# Patient Record
Sex: MALE | Race: BLACK OR AFRICAN AMERICAN | Employment: UNEMPLOYED | URBAN - METROPOLITAN AREA
[De-identification: names, ages, dates, MRNs, and addresses within clinical notes are randomized per-mention and may not be internally consistent; named-entity substitution may affect disease eponyms.]

---

## 2024-01-21 ENCOUNTER — APPOINTMENT (OUTPATIENT)
Dept: GENERAL RADIOLOGY | Age: 29
DRG: 155 | End: 2024-01-21

## 2024-01-21 ENCOUNTER — HOSPITAL ENCOUNTER (INPATIENT)
Age: 29
LOS: 1 days | Discharge: HOME | DRG: 155 | End: 2024-01-21
Attending: EMERGENCY MEDICINE | Admitting: SURGERY

## 2024-01-21 ENCOUNTER — APPOINTMENT (OUTPATIENT)
Dept: CT IMAGING | Age: 29
DRG: 155 | End: 2024-01-21

## 2024-01-21 VITALS
RESPIRATION RATE: 16 BRPM | SYSTOLIC BLOOD PRESSURE: 151 MMHG | TEMPERATURE: 97.8 F | WEIGHT: 280 LBS | HEIGHT: 74 IN | BODY MASS INDEX: 35.94 KG/M2 | HEART RATE: 91 BPM | DIASTOLIC BLOOD PRESSURE: 100 MMHG | OXYGEN SATURATION: 99 %

## 2024-01-21 DIAGNOSIS — S22.31XA CLOSED FRACTURE OF ONE RIB OF RIGHT SIDE, INITIAL ENCOUNTER: ICD-10-CM
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
## 2024-01-21 DIAGNOSIS — S02.2XXA CLOSED FRACTURE OF NASAL BONE, INITIAL ENCOUNTER: ICD-10-CM

## 2024-01-21 PROBLEM — T14.90XA TRAUMA: Status: ACTIVE | Noted: 2024-01-21

## 2024-01-21 LAB
ABO + RH BLD: NORMAL
ALBUMIN SERPL-MCNC: 4.5 G/DL (ref 3.5–5.2)
ALP SERPL-CCNC: 58 U/L (ref 40–129)
ALT SERPL-CCNC: 48 U/L (ref 0–40)
AMPHET UR QL SCN: NEGATIVE
ANION GAP SERPL CALCULATED.3IONS-SCNC: 13 MMOL/L (ref 7–16)
ANION GAP SERPL CALCULATED.3IONS-SCNC: 13 MMOL/L (ref 7–16)
APAP SERPL-MCNC: <5 UG/ML (ref 10–30)
ARM BAND NUMBER: NORMAL
AST SERPL-CCNC: 45 U/L (ref 0–39)
B.E.: -1 MMOL/L (ref -3–3)
BARBITURATES UR QL SCN: NEGATIVE
BENZODIAZ UR QL: NEGATIVE
BILIRUB SERPL-MCNC: 0.2 MG/DL (ref 0–1.2)
BLOOD BANK SAMPLE EXPIRATION: NORMAL
BLOOD GROUP ANTIBODIES SERPL: NEGATIVE
BUN SERPL-MCNC: 10 MG/DL (ref 6–20)
BUN SERPL-MCNC: 8 MG/DL (ref 6–20)
BUPRENORPHINE UR QL: NEGATIVE
CALCIUM SERPL-MCNC: 8.8 MG/DL (ref 8.6–10.2)
CALCIUM SERPL-MCNC: 9 MG/DL (ref 8.6–10.2)
CANNABINOIDS UR QL SCN: POSITIVE
CHLORIDE SERPL-SCNC: 107 MMOL/L (ref 98–107)
CHLORIDE SERPL-SCNC: 107 MMOL/L (ref 98–107)
CLOT ANGLE.KAOLIN INDUCED BLD RES TEG: 68.5 DEG (ref 53–70)
CO2 SERPL-SCNC: 22 MMOL/L (ref 22–29)
CO2 SERPL-SCNC: 23 MMOL/L (ref 22–29)
COCAINE UR QL SCN: NEGATIVE
COHB: 4.4 % (ref 0–1.5)
CREAT SERPL-MCNC: 0.7 MG/DL (ref 0.7–1.2)
CREAT SERPL-MCNC: 0.9 MG/DL (ref 0.7–1.2)
CRITICAL: ABNORMAL
DATE ANALYZED: ABNORMAL
DATE OF COLLECTION: ABNORMAL
EPL-TEG: 0.7 % (ref 0–15)
ERYTHROCYTE [DISTWIDTH] IN BLOOD BY AUTOMATED COUNT: 14.3 % (ref 11.5–15)
ETHANOLAMINE SERPL-MCNC: 199 MG/DL
ETHANOLAMINE SERPL-MCNC: 250 MG/DL
FENTANYL UR QL: NEGATIVE
G-TEG: 8.5 KDYN/CM2 (ref 4.5–11)
GFR SERPL CREATININE-BSD FRML MDRD: >60 ML/MIN/1.73M2
GFR SERPL CREATININE-BSD FRML MDRD: >60 ML/MIN/1.73M2
GLUCOSE SERPL-MCNC: 100 MG/DL (ref 74–99)
GLUCOSE SERPL-MCNC: 92 MG/DL (ref 74–99)
HCO3: 22.6 MMOL/L (ref 22–26)
HCT VFR BLD AUTO: 40.3 % (ref 37–54)
HGB BLD-MCNC: 13.1 G/DL (ref 12.5–16.5)
HHB: 0.5 % (ref 0–5)
INR PPP: 1
KINETICS TEG: 1.5 MIN (ref 1–3)
LAB: ABNORMAL
LACTATE BLDV-SCNC: 1.8 MMOL/L (ref 0.5–2.2)
LY30 (LYSIS) TEG: 0.7 % (ref 0–8)
Lab: 255
MA (MAX CLOT) TEG: 62.9 MM (ref 50–70)
MCH RBC QN AUTO: 29.1 PG (ref 26–35)
MCHC RBC AUTO-ENTMCNC: 32.5 G/DL (ref 32–34.5)
MCV RBC AUTO: 89.6 FL (ref 80–99.9)
METHADONE UR QL: NEGATIVE
METHB: 0.4 % (ref 0–1.5)
MODE: ABNORMAL
O2 CONTENT: 19.5 ML/DL
O2 SATURATION: 99.5 % (ref 92–98.5)
O2HB: 94.7 % (ref 94–97)
OPERATOR ID: 2962
OPIATES UR QL SCN: NEGATIVE
OXYCODONE UR QL SCN: NEGATIVE
PARTIAL THROMBOPLASTIN TIME: 29.2 SEC (ref 24.5–35.1)
PATIENT TEMP: 37 C
PCO2: 34.4 MMHG (ref 35–45)
PCP UR QL SCN: NEGATIVE
PH BLOOD GAS: 7.43 (ref 7.35–7.45)
PLATELET # BLD AUTO: 219 K/UL (ref 130–450)
PMV BLD AUTO: 10.4 FL (ref 7–12)
PO2: 310 MMHG (ref 75–100)
POTASSIUM SERPL-SCNC: 3.59 MMOL/L (ref 3.5–5)
POTASSIUM SERPL-SCNC: 3.6 MMOL/L (ref 3.5–5)
POTASSIUM SERPL-SCNC: 3.8 MMOL/L (ref 3.5–5)
PROT SERPL-MCNC: 7.9 G/DL (ref 6.4–8.3)
PROTHROMBIN TIME: 10.9 SEC (ref 9.3–12.4)
RBC # BLD AUTO: 4.5 M/UL (ref 3.8–5.8)
REACTION TIME TEG: 5.1 MIN (ref 5–10)
SALICYLATES SERPL-MCNC: <0.3 MG/DL (ref 0–30)
SODIUM SERPL-SCNC: 142 MMOL/L (ref 132–146)
SODIUM SERPL-SCNC: 143 MMOL/L (ref 132–146)
SOURCE, BLOOD GAS: ABNORMAL
TEST INFORMATION: ABNORMAL
THB: 14.1 G/DL (ref 11.5–16.5)
TIME ANALYZED: 257
TOXIC TRICYCLIC SC,BLOOD: NEGATIVE
WBC OTHER # BLD: 8.6 K/UL (ref 4.5–11.5)

## 2024-01-21 PROCEDURE — 90714 TD VACC NO PRESV 7 YRS+ IM: CPT | Performed by: STUDENT IN AN ORGANIZED HEALTH CARE EDUCATION/TRAINING PROGRAM

## 2024-01-21 PROCEDURE — 85027 COMPLETE CBC AUTOMATED: CPT

## 2024-01-21 PROCEDURE — 36415 COLL VENOUS BLD VENIPUNCTURE: CPT

## 2024-01-21 PROCEDURE — 6810039001 HC L1 TRAUMA PRIORITY

## 2024-01-21 PROCEDURE — 70486 CT MAXILLOFACIAL W/O DYE: CPT

## 2024-01-21 PROCEDURE — 6370000000 HC RX 637 (ALT 250 FOR IP): Performed by: STUDENT IN AN ORGANIZED HEALTH CARE EDUCATION/TRAINING PROGRAM

## 2024-01-21 PROCEDURE — 71045 X-RAY EXAM CHEST 1 VIEW: CPT

## 2024-01-21 PROCEDURE — 90471 IMMUNIZATION ADMIN: CPT | Performed by: STUDENT IN AN ORGANIZED HEALTH CARE EDUCATION/TRAINING PROGRAM

## 2024-01-21 PROCEDURE — 80053 COMPREHEN METABOLIC PANEL: CPT

## 2024-01-21 PROCEDURE — 86901 BLOOD TYPING SEROLOGIC RH(D): CPT

## 2024-01-21 PROCEDURE — 2580000003 HC RX 258: Performed by: STUDENT IN AN ORGANIZED HEALTH CARE EDUCATION/TRAINING PROGRAM

## 2024-01-21 PROCEDURE — 85384 FIBRINOGEN ACTIVITY: CPT

## 2024-01-21 PROCEDURE — 6360000002 HC RX W HCPCS: Performed by: STUDENT IN AN ORGANIZED HEALTH CARE EDUCATION/TRAINING PROGRAM

## 2024-01-21 PROCEDURE — 85576 BLOOD PLATELET AGGREGATION: CPT

## 2024-01-21 PROCEDURE — 70450 CT HEAD/BRAIN W/O DYE: CPT

## 2024-01-21 PROCEDURE — 85347 COAGULATION TIME ACTIVATED: CPT

## 2024-01-21 PROCEDURE — 85730 THROMBOPLASTIN TIME PARTIAL: CPT

## 2024-01-21 PROCEDURE — 97161 PT EVAL LOW COMPLEX 20 MIN: CPT

## 2024-01-21 PROCEDURE — G0480 DRUG TEST DEF 1-7 CLASSES: HCPCS

## 2024-01-21 PROCEDURE — 72125 CT NECK SPINE W/O DYE: CPT

## 2024-01-21 PROCEDURE — 97530 THERAPEUTIC ACTIVITIES: CPT

## 2024-01-21 PROCEDURE — 86850 RBC ANTIBODY SCREEN: CPT

## 2024-01-21 PROCEDURE — 80179 DRUG ASSAY SALICYLATE: CPT

## 2024-01-21 PROCEDURE — 71260 CT THORAX DX C+: CPT

## 2024-01-21 PROCEDURE — 85610 PROTHROMBIN TIME: CPT

## 2024-01-21 PROCEDURE — 80048 BASIC METABOLIC PNL TOTAL CA: CPT

## 2024-01-21 PROCEDURE — 1200000000 HC SEMI PRIVATE

## 2024-01-21 PROCEDURE — 80143 DRUG ASSAY ACETAMINOPHEN: CPT

## 2024-01-21 PROCEDURE — 84132 ASSAY OF SERUM POTASSIUM: CPT

## 2024-01-21 PROCEDURE — 80307 DRUG TEST PRSMV CHEM ANLYZR: CPT

## 2024-01-21 PROCEDURE — 85390 FIBRINOLYSINS SCREEN I&R: CPT

## 2024-01-21 PROCEDURE — 97165 OT EVAL LOW COMPLEX 30 MIN: CPT

## 2024-01-21 PROCEDURE — 99285 EMERGENCY DEPT VISIT HI MDM: CPT

## 2024-01-21 PROCEDURE — 82805 BLOOD GASES W/O2 SATURATION: CPT

## 2024-01-21 PROCEDURE — 6360000004 HC RX CONTRAST MEDICATION: Performed by: RADIOLOGY

## 2024-01-21 PROCEDURE — 74177 CT ABD & PELVIS W/CONTRAST: CPT

## 2024-01-21 PROCEDURE — 83605 ASSAY OF LACTIC ACID: CPT

## 2024-01-21 PROCEDURE — 86900 BLOOD TYPING SEROLOGIC ABO: CPT

## 2024-01-21 PROCEDURE — 72170 X-RAY EXAM OF PELVIS: CPT

## 2024-01-21 RX ORDER — SENNA AND DOCUSATE SODIUM 50; 8.6 MG/1; MG/1
1 TABLET, FILM COATED ORAL 2 TIMES DAILY
Status: DISCONTINUED | OUTPATIENT
Start: 2024-01-21 | End: 2024-01-21 | Stop reason: HOSPADM

## 2024-01-21 RX ORDER — ACETAMINOPHEN 500 MG
1000 TABLET ORAL ONCE
Status: DISCONTINUED | OUTPATIENT
Start: 2024-01-21 | End: 2024-01-21 | Stop reason: SDUPTHER

## 2024-01-21 RX ORDER — SODIUM CHLORIDE 0.9 % (FLUSH) 0.9 %
5-40 SYRINGE (ML) INJECTION EVERY 12 HOURS SCHEDULED
Status: DISCONTINUED | OUTPATIENT
Start: 2024-01-21 | End: 2024-01-21 | Stop reason: HOSPADM

## 2024-01-21 RX ORDER — SODIUM CHLORIDE 0.9 % (FLUSH) 0.9 %
5-40 SYRINGE (ML) INJECTION PRN
Status: DISCONTINUED | OUTPATIENT
Start: 2024-01-21 | End: 2024-01-21 | Stop reason: HOSPADM

## 2024-01-21 RX ORDER — METHOCARBAMOL 1000 MG/1
1000 TABLET, COATED ORAL 4 TIMES DAILY
Qty: 40 TABLET | Refills: 0 | Status: SHIPPED | OUTPATIENT
Start: 2024-01-21 | End: 2024-01-31

## 2024-01-21 RX ORDER — ONDANSETRON 2 MG/ML
4 INJECTION INTRAMUSCULAR; INTRAVENOUS EVERY 6 HOURS PRN
Status: DISCONTINUED | OUTPATIENT
Start: 2024-01-21 | End: 2024-01-21 | Stop reason: HOSPADM

## 2024-01-21 RX ORDER — OXYCODONE HYDROCHLORIDE 5 MG/1
5 TABLET ORAL EVERY 4 HOURS PRN
Status: DISCONTINUED | OUTPATIENT
Start: 2024-01-21 | End: 2024-01-21 | Stop reason: HOSPADM

## 2024-01-21 RX ORDER — TETANUS AND DIPHTHERIA TOXOIDS ADSORBED 2; 2 [LF]/.5ML; [LF]/.5ML
0.5 INJECTION INTRAMUSCULAR ONCE
Status: COMPLETED | OUTPATIENT
Start: 2024-01-21 | End: 2024-01-21

## 2024-01-21 RX ORDER — SENNA AND DOCUSATE SODIUM 50; 8.6 MG/1; MG/1
1 TABLET, FILM COATED ORAL DAILY
Qty: 30 TABLET | Refills: 0 | Status: SHIPPED | OUTPATIENT
Start: 2024-01-21 | End: 2024-02-20

## 2024-01-21 RX ORDER — SODIUM CHLORIDE 9 MG/ML
INJECTION, SOLUTION INTRAVENOUS CONTINUOUS
Status: DISCONTINUED | OUTPATIENT
Start: 2024-01-21 | End: 2024-01-21 | Stop reason: HOSPADM

## 2024-01-21 RX ORDER — OXYCODONE HYDROCHLORIDE 10 MG/1
10 TABLET ORAL EVERY 4 HOURS PRN
Status: DISCONTINUED | OUTPATIENT
Start: 2024-01-21 | End: 2024-01-21 | Stop reason: HOSPADM

## 2024-01-21 RX ORDER — POLYETHYLENE GLYCOL 3350 17 G/17G
17 POWDER, FOR SOLUTION ORAL DAILY
Status: DISCONTINUED | OUTPATIENT
Start: 2024-01-21 | End: 2024-01-21 | Stop reason: HOSPADM

## 2024-01-21 RX ORDER — BACITRACIN ZINC AND POLYMYXIN B SULFATE 500; 1000 [USP'U]/G; [USP'U]/G
OINTMENT TOPICAL
Qty: 15 G | Refills: 0 | Status: SHIPPED | OUTPATIENT
Start: 2024-01-21 | End: 2024-01-28

## 2024-01-21 RX ORDER — ONDANSETRON 2 MG/ML
4 INJECTION INTRAMUSCULAR; INTRAVENOUS EVERY 6 HOURS PRN
Status: DISCONTINUED | OUTPATIENT
Start: 2024-01-21 | End: 2024-01-21 | Stop reason: SDUPTHER

## 2024-01-21 RX ORDER — LIDOCAINE 4 G/G
1 PATCH TOPICAL DAILY
Status: DISCONTINUED | OUTPATIENT
Start: 2024-01-21 | End: 2024-01-21 | Stop reason: HOSPADM

## 2024-01-21 RX ORDER — ACETAMINOPHEN 325 MG/1
650 TABLET ORAL EVERY 6 HOURS
Status: DISCONTINUED | OUTPATIENT
Start: 2024-01-21 | End: 2024-01-21 | Stop reason: HOSPADM

## 2024-01-21 RX ORDER — SODIUM CHLORIDE 9 MG/ML
INJECTION, SOLUTION INTRAVENOUS PRN
Status: DISCONTINUED | OUTPATIENT
Start: 2024-01-21 | End: 2024-01-21 | Stop reason: HOSPADM

## 2024-01-21 RX ORDER — METHOCARBAMOL 500 MG/1
1000 TABLET, FILM COATED ORAL 4 TIMES DAILY
Status: DISCONTINUED | OUTPATIENT
Start: 2024-01-21 | End: 2024-01-21 | Stop reason: HOSPADM

## 2024-01-21 RX ORDER — ONDANSETRON 4 MG/1
4 TABLET, ORALLY DISINTEGRATING ORAL EVERY 8 HOURS PRN
Status: DISCONTINUED | OUTPATIENT
Start: 2024-01-21 | End: 2024-01-21 | Stop reason: HOSPADM

## 2024-01-21 RX ADMIN — IOPAMIDOL 75 ML: 755 INJECTION, SOLUTION INTRAVENOUS at 03:17

## 2024-01-21 RX ADMIN — SODIUM CHLORIDE: 9 INJECTION, SOLUTION INTRAVENOUS at 03:30

## 2024-01-21 RX ADMIN — SODIUM CHLORIDE, PRESERVATIVE FREE 10 ML: 5 INJECTION INTRAVENOUS at 08:57

## 2024-01-21 RX ADMIN — METHOCARBAMOL 1000 MG: 500 TABLET ORAL at 08:55

## 2024-01-21 RX ADMIN — TETANUS AND DIPHTHERIA TOXOIDS ADSORBED 0.5 ML: 2; 2 INJECTION INTRAMUSCULAR at 03:24

## 2024-01-21 RX ADMIN — SENNOSIDES AND DOCUSATE SODIUM 1 TABLET: 50; 8.6 TABLET ORAL at 08:55

## 2024-01-21 RX ADMIN — POLYETHYLENE GLYCOL 3350 17 G: 17 POWDER, FOR SOLUTION ORAL at 08:57

## 2024-01-21 RX ADMIN — ACETAMINOPHEN 650 MG: 325 TABLET ORAL at 08:55

## 2024-01-21 ASSESSMENT — PAIN SCALES - WONG BAKER
WONGBAKER_NUMERICALRESPONSE: 6

## 2024-01-21 ASSESSMENT — PAIN SCALES - GENERAL: PAINLEVEL_OUTOF10: 2

## 2024-01-21 NOTE — PLAN OF CARE
Problem: Pain  Goal: Verbalizes/displays adequate comfort level or baseline comfort level  1/21/2024 1055 by Josseline Ba RN  Outcome: Progressing  1/21/2024 0658 by Anisha Pete RN  Outcome: Progressing     Problem: Safety - Adult  Goal: Free from fall injury  1/21/2024 1055 by Josseline Ba RN  Outcome: Progressing  1/21/2024 0658 by Anisha Pete RN  Outcome: Progressing     Problem: Skin/Tissue Integrity  Goal: Absence of new skin breakdown  Description: 1.  Monitor for areas of redness and/or skin breakdown  2.  Assess vascular access sites hourly  3.  Every 4-6 hours minimum:  Change oxygen saturation probe site  4.  Every 4-6 hours:  If on nasal continuous positive airway pressure, respiratory therapy assess nares and determine need for appliance change or resting period.  1/21/2024 1055 by Josseline Ba RN  Outcome: Progressing  1/21/2024 0658 by Anisha Pete RN  Outcome: Progressing

## 2024-01-21 NOTE — ED PROVIDER NOTES
focal deficits, symmetric strength 5/5 in the upper and lower extremities bilaterally  Psych: Normal Affect    -------------------------------------------------- RESULTS -------------------------------------------------  I have personally reviewed all laboratory and imaging results for this patient. Results are listed below.     LABS:  Results for orders placed or performed during the hospital encounter of 01/21/24   Blood Gas, Arterial   Result Value Ref Range    Date Analyzed 20240121     Time Analyzed 0257     Source: Blood Arterial     pH, Blood Gas 7.435 7.350 - 7.450    PCO2 34.4 (L) 35.0 - 45.0 mmHg    PO2 310.0 (H) 75.0 - 100.0 mmHg    HCO3 22.6 22.0 - 26.0 mmol/L    B.E. -1.0 -3.0 - 3.0 mmol/L    O2 Sat 99.5 (H) 92.0 - 98.5 %    O2Hb 94.7 94.0 - 97.0 %    COHb 4.4 (H) 0.0 - 1.5 %    MetHb 0.4 0.0 - 1.5 %    O2 Content 19.5 mL/dL    HHb 0.5 0.0 - 5.0 %    tHb (est) 14.1 11.5 - 16.5 g/dL    Potassium 3.59 3.50 - 5.00 mmol/L    Mode NRB 15L     Date Of Collection 20240121     Time Collected 0255     Pt Temp 37.0 C     ID [id number]     Lab 74201     Critical(s) Notified . No Critical Values    CBC   Result Value Ref Range    WBC 8.6 4.5 - 11.5 k/uL    RBC 4.50 3.80 - 5.80 m/uL    Hemoglobin 13.1 12.5 - 16.5 g/dL    Hematocrit 40.3 37.0 - 54.0 %    MCV 89.6 80.0 - 99.9 fL    MCH 29.1 26.0 - 35.0 pg    MCHC 32.5 32.0 - 34.5 g/dL    RDW 14.3 11.5 - 15.0 %    Platelets 219 130 - 450 k/uL    MPV 10.4 7.0 - 12.0 fL   Comprehensive Metabolic Panel   Result Value Ref Range    Sodium 143 132 - 146 mmol/L    Potassium 3.6 3.5 - 5.0 mmol/L    Chloride 107 98 - 107 mmol/L    CO2 23 22 - 29 mmol/L    Anion Gap 13 7 - 16 mmol/L    Glucose 100 (H) 74 - 99 mg/dL    BUN 10 6 - 20 mg/dL    Creatinine 0.9 0.70 - 1.20 mg/dL    Est, Glom Filt Rate >60 >60 mL/min/1.73m2    Calcium 9.0 8.6 - 10.2 mg/dL    Total Protein 7.9 6.4 - 8.3 g/dL    Albumin 4.5 3.5 - 5.2 g/dL    Total Bilirubin 0.2 0.0 - 1.2 mg/dL    Alkaline Phosphatase

## 2024-01-21 NOTE — PLAN OF CARE
Problem: Pain  Goal: Verbalizes/displays adequate comfort level or baseline comfort level  Outcome: Progressing     Problem: Safety - Adult  Goal: Free from fall injury  Outcome: Progressing     Problem: Skin/Tissue Integrity  Goal: Absence of new skin breakdown  Description: 1.  Monitor for areas of redness and/or skin breakdown  2.  Assess vascular access sites hourly  3.  Every 4-6 hours minimum:  Change oxygen saturation probe site  4.  Every 4-6 hours:  If on nasal continuous positive airway pressure, respiratory therapy assess nares and determine need for appliance change or resting period.  Outcome: Progressing
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
## 2024-01-21 NOTE — ED NOTES
Pt log rolled onto L-side, C-spine precautions maintained  No step offs, deformities, or signs of trauma

## 2024-01-21 NOTE — DISCHARGE INSTRUCTIONS
TRAUMA SERVICES DISCHARGE INSTRUCTIONS    Call [phone], option 2, for any questions/concerns and for follow-up appointment in 2 week(s).    Please follow the instructions checked below:    Please follow-up with your primary care provider.    ACTIVITY INSTRUCTIONS  Increase activity as tolerated  No heavy lifting or strenuous activity  Take your incentive spirometer home and use 4-6 times/day    WOUND/DRESSING INSTRUCTIONS:  You may shower.  No sitting in bath tub, hot tub or swimming until cleared by physician.  Ice to areas of pain for first 24 hours.  Heat to areas of pain after that.  Wash areas of lacerations/abrasions with soap & water.  Rinse well.  Pat dry with clean towel.  Apply thin layer of Bacitracin, Neosporin, or triple antibiotic cream to affected area 2-3 times per day.  Keep wounds clean and dry.    MEDICATION INSTRUCTIONS  Take medication as prescribed.  When taking pain medications, you may experience dizziness or drowsiness.  Do not drink alcohol or drive when taking these medications.  You may experience constipation while taking pain medication.  You may take over the counter stool softeners such as docusate (Colace), sennosides S (Senokot-S), or Miralax.   [x]  You may take Ibuprofen (over the counter) as directed for mild pain.     --You may take up to 800mg every 8 hours for pain, please take with food or milk.   [x]  You may take acetaminophen (Tylenol) products.  Do NOT take more than 4000mg of Tylenol in 24h.   []  Do not take any other acetaminophen (Tylenol) products if you are taking Percocet or Norco, as these contain Tylenol.   --Do NOT take more than 4000mg of Tylenol in 24h.    OPIOID MEDICATION INSTRUCTIONS  Read the medication guide that is included with your prescription.  Take your medication exactly as prescribed.  Store medication away from children and in a safe place.  Do NOT share your medication with others.  Do NOT take medication unless it is prescribed for
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
## 2024-06-28 ENCOUNTER — HOSPITAL ENCOUNTER (INPATIENT)
Age: 29
LOS: 1 days | Discharge: HOME | End: 2024-06-29
Attending: EMERGENCY MEDICINE | Admitting: SURGERY

## 2024-06-28 ENCOUNTER — APPOINTMENT (OUTPATIENT)
Dept: GENERAL RADIOLOGY | Age: 29
End: 2024-06-28

## 2024-06-28 ENCOUNTER — APPOINTMENT (OUTPATIENT)
Dept: CT IMAGING | Age: 29
End: 2024-06-28
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
## 2024-06-28 DIAGNOSIS — S42.001A CLOSED DISPLACED FRACTURE OF RIGHT CLAVICLE, UNSPECIFIED PART OF CLAVICLE, INITIAL ENCOUNTER: ICD-10-CM

## 2024-06-28 PROBLEM — S42.101A CLOSED FRACTURE OF RIGHT SCAPULA: Status: ACTIVE | Noted: 2024-06-28

## 2024-06-28 PROBLEM — S42.031B: Status: ACTIVE | Noted: 2024-06-28

## 2024-06-28 LAB
ABO + RH BLD: NORMAL
ABO + RH BLD: NORMAL
ALBUMIN SERPL-MCNC: 4.1 G/DL (ref 3.5–5.2)
ALBUMIN SERPL-MCNC: 4.1 G/DL (ref 3.5–5.2)
ALP SERPL-CCNC: 63 U/L (ref 40–129)
ALP SERPL-CCNC: 63 U/L (ref 40–129)
ALT SERPL-CCNC: 58 U/L (ref 0–40)
ALT SERPL-CCNC: 58 U/L (ref 0–40)
AMPHET UR QL SCN: NEGATIVE
AMPHET UR QL SCN: NEGATIVE
ANION GAP SERPL CALCULATED.3IONS-SCNC: 12 MMOL/L (ref 7–16)
ANION GAP SERPL CALCULATED.3IONS-SCNC: 12 MMOL/L (ref 7–16)
APAP SERPL-MCNC: <5 UG/ML (ref 10–30)
APAP SERPL-MCNC: <5 UG/ML (ref 10–30)
ARM BAND NUMBER: NORMAL
ARM BAND NUMBER: NORMAL
AST SERPL-CCNC: 48 U/L (ref 0–39)
AST SERPL-CCNC: 48 U/L (ref 0–39)
B.E.: -1.4 MMOL/L (ref -3–3)
B.E.: -1.4 MMOL/L (ref -3–3)
BARBITURATES UR QL SCN: NEGATIVE
BARBITURATES UR QL SCN: NEGATIVE
BASOPHILS # BLD: 0.03 K/UL (ref 0–0.2)
BASOPHILS # BLD: 0.03 K/UL (ref 0–0.2)
BASOPHILS NFR BLD: 0 % (ref 0–2)
BASOPHILS NFR BLD: 0 % (ref 0–2)
BENZODIAZ UR QL: NEGATIVE
BENZODIAZ UR QL: NEGATIVE
BILIRUB SERPL-MCNC: 0.2 MG/DL (ref 0–1.2)
BILIRUB SERPL-MCNC: 0.2 MG/DL (ref 0–1.2)
BLOOD BANK SAMPLE EXPIRATION: NORMAL
BLOOD BANK SAMPLE EXPIRATION: NORMAL
BLOOD GROUP ANTIBODIES SERPL: NEGATIVE
BLOOD GROUP ANTIBODIES SERPL: NEGATIVE
BUN SERPL-MCNC: 12 MG/DL (ref 6–20)
BUN SERPL-MCNC: 12 MG/DL (ref 6–20)
BUPRENORPHINE UR QL: POSITIVE
BUPRENORPHINE UR QL: POSITIVE
CALCIUM SERPL-MCNC: 8.4 MG/DL (ref 8.6–10.2)
CALCIUM SERPL-MCNC: 8.4 MG/DL (ref 8.6–10.2)
CANNABINOIDS UR QL SCN: POSITIVE
CANNABINOIDS UR QL SCN: POSITIVE
CHLORIDE SERPL-SCNC: 104 MMOL/L (ref 98–107)
CHLORIDE SERPL-SCNC: 104 MMOL/L (ref 98–107)
CO2 SERPL-SCNC: 24 MMOL/L (ref 22–29)
CO2 SERPL-SCNC: 24 MMOL/L (ref 22–29)
COCAINE UR QL SCN: POSITIVE
COCAINE UR QL SCN: POSITIVE
COHB: 8 % (ref 0–1.5)
COHB: 8 % (ref 0–1.5)
CREAT SERPL-MCNC: 0.9 MG/DL (ref 0.7–1.2)
CREAT SERPL-MCNC: 0.9 MG/DL (ref 0.7–1.2)
CRITICAL: ABNORMAL
CRITICAL: ABNORMAL
DATE ANALYZED: ABNORMAL
DATE ANALYZED: ABNORMAL
DATE OF COLLECTION: ABNORMAL
DATE OF COLLECTION: ABNORMAL
EOSINOPHIL # BLD: 0.05 K/UL (ref 0.05–0.5)
EOSINOPHIL # BLD: 0.05 K/UL (ref 0.05–0.5)
EOSINOPHILS RELATIVE PERCENT: 1 % (ref 0–6)
EOSINOPHILS RELATIVE PERCENT: 1 % (ref 0–6)
ERYTHROCYTE [DISTWIDTH] IN BLOOD BY AUTOMATED COUNT: 14.4 % (ref 11.5–15)
ERYTHROCYTE [DISTWIDTH] IN BLOOD BY AUTOMATED COUNT: 14.4 % (ref 11.5–15)
ETHANOLAMINE SERPL-MCNC: 110 MG/DL (ref 0–0.08)
ETHANOLAMINE SERPL-MCNC: 110 MG/DL (ref 0–0.08)
FENTANYL UR QL: NEGATIVE
FENTANYL UR QL: NEGATIVE
GFR, ESTIMATED: >90 ML/MIN/1.73M2
GFR, ESTIMATED: >90 ML/MIN/1.73M2
GLUCOSE SERPL-MCNC: 140 MG/DL (ref 74–99)
GLUCOSE SERPL-MCNC: 140 MG/DL (ref 74–99)
HCO3: 24.2 MMOL/L (ref 22–26)
HCO3: 24.2 MMOL/L (ref 22–26)
HCT VFR BLD AUTO: 40.2 % (ref 37–54)
HCT VFR BLD AUTO: 40.2 % (ref 37–54)
HGB BLD-MCNC: 12.7 G/DL (ref 12.5–16.5)
HGB BLD-MCNC: 12.7 G/DL (ref 12.5–16.5)
HHB: 0.4 % (ref 0–5)
HHB: 0.4 % (ref 0–5)
IMM GRANULOCYTES # BLD AUTO: <0.03 K/UL (ref 0–0.58)
IMM GRANULOCYTES # BLD AUTO: <0.03 K/UL (ref 0–0.58)
IMM GRANULOCYTES NFR BLD: 0 % (ref 0–5)
IMM GRANULOCYTES NFR BLD: 0 % (ref 0–5)
INR PPP: 1
INR PPP: 1
LAB: ABNORMAL
LAB: ABNORMAL
LACTATE BLDV-SCNC: 2.4 MMOL/L (ref 0.5–2.2)
LACTATE BLDV-SCNC: 2.4 MMOL/L (ref 0.5–2.2)
LYMPHOCYTES NFR BLD: 4.23 K/UL (ref 1.5–4)
LYMPHOCYTES NFR BLD: 4.23 K/UL (ref 1.5–4)
LYMPHOCYTES RELATIVE PERCENT: 48 % (ref 20–42)
LYMPHOCYTES RELATIVE PERCENT: 48 % (ref 20–42)
Lab: 310
Lab: 310
MCH RBC QN AUTO: 28.5 PG (ref 26–35)
MCH RBC QN AUTO: 28.5 PG (ref 26–35)
MCHC RBC AUTO-ENTMCNC: 31.6 G/DL (ref 32–34.5)
MCHC RBC AUTO-ENTMCNC: 31.6 G/DL (ref 32–34.5)
MCV RBC AUTO: 90.3 FL (ref 80–99.9)
MCV RBC AUTO: 90.3 FL (ref 80–99.9)
METHADONE UR QL: NEGATIVE
METHADONE UR QL: NEGATIVE
METHB: 0.1 % (ref 0–1.5)
METHB: 0.1 % (ref 0–1.5)
MODE: ABNORMAL
MODE: ABNORMAL
MONOCYTES NFR BLD: 0.43 K/UL (ref 0.1–0.95)
MONOCYTES NFR BLD: 0.43 K/UL (ref 0.1–0.95)
MONOCYTES NFR BLD: 5 % (ref 2–12)
MONOCYTES NFR BLD: 5 % (ref 2–12)
NEUTROPHILS NFR BLD: 46 % (ref 43–80)
NEUTROPHILS NFR BLD: 46 % (ref 43–80)
NEUTS SEG NFR BLD: 4 K/UL (ref 1.8–7.3)
NEUTS SEG NFR BLD: 4 K/UL (ref 1.8–7.3)
O2 SATURATION: 99.6 % (ref 92–98.5)
O2 SATURATION: 99.6 % (ref 92–98.5)
O2HB: 91.5 % (ref 94–97)
O2HB: 91.5 % (ref 94–97)
OPERATOR ID: 2067
OPERATOR ID: 2067
OPIATES UR QL SCN: NEGATIVE
OPIATES UR QL SCN: NEGATIVE
OXYCODONE UR QL SCN: POSITIVE
OXYCODONE UR QL SCN: POSITIVE
PARTIAL THROMBOPLASTIN TIME: 22.5 SEC (ref 24.5–35.1)
PARTIAL THROMBOPLASTIN TIME: 22.5 SEC (ref 24.5–35.1)
PATIENT TEMP: 37 C
PATIENT TEMP: 37 C
PCO2: 44.1 MMHG (ref 35–45)
PCO2: 44.1 MMHG (ref 35–45)
PCP UR QL SCN: NEGATIVE
PCP UR QL SCN: NEGATIVE
PH BLOOD GAS: 7.36 (ref 7.35–7.45)
PH BLOOD GAS: 7.36 (ref 7.35–7.45)
PLATELET # BLD AUTO: 230 K/UL (ref 130–450)
PLATELET # BLD AUTO: 230 K/UL (ref 130–450)
PMV BLD AUTO: 10.4 FL (ref 7–12)
PMV BLD AUTO: 10.4 FL (ref 7–12)
PO2: 278.4 MMHG (ref 75–100)
PO2: 278.4 MMHG (ref 75–100)
POTASSIUM SERPL-SCNC: 3.63 MMOL/L (ref 3.5–5)
POTASSIUM SERPL-SCNC: 3.63 MMOL/L (ref 3.5–5)
POTASSIUM SERPL-SCNC: 3.8 MMOL/L (ref 3.5–5)
POTASSIUM SERPL-SCNC: 3.8 MMOL/L (ref 3.5–5)
PROT SERPL-MCNC: 7.2 G/DL (ref 6.4–8.3)
PROT SERPL-MCNC: 7.2 G/DL (ref 6.4–8.3)
PROTHROMBIN TIME: 11.2 SEC (ref 9.3–12.4)
PROTHROMBIN TIME: 11.2 SEC (ref 9.3–12.4)
RBC # BLD AUTO: 4.45 M/UL (ref 3.8–5.8)
RBC # BLD AUTO: 4.45 M/UL (ref 3.8–5.8)
SALICYLATES SERPL-MCNC: <0.3 MG/DL (ref 0–30)
SALICYLATES SERPL-MCNC: <0.3 MG/DL (ref 0–30)
SODIUM SERPL-SCNC: 140 MMOL/L (ref 132–146)
SODIUM SERPL-SCNC: 140 MMOL/L (ref 132–146)
SOURCE, BLOOD GAS: ABNORMAL
SOURCE, BLOOD GAS: ABNORMAL
TEST INFORMATION: ABNORMAL
TEST INFORMATION: ABNORMAL
THB: 13.8 G/DL (ref 11.5–16.5)
THB: 13.8 G/DL (ref 11.5–16.5)
TIME ANALYZED: 313
TIME ANALYZED: 313
TOXIC TRICYCLIC SC,BLOOD: NEGATIVE
TOXIC TRICYCLIC SC,BLOOD: NEGATIVE
WBC OTHER # BLD: 8.8 K/UL (ref 4.5–11.5)
WBC OTHER # BLD: 8.8 K/UL (ref 4.5–11.5)

## 2024-06-28 PROCEDURE — G0480 DRUG TEST DEF 1-7 CLASSES: HCPCS

## 2024-06-28 PROCEDURE — 80053 COMPREHEN METABOLIC PANEL: CPT

## 2024-06-28 PROCEDURE — 73060 X-RAY EXAM OF HUMERUS: CPT

## 2024-06-28 PROCEDURE — 85610 PROTHROMBIN TIME: CPT

## 2024-06-28 PROCEDURE — 6360000004 HC RX CONTRAST MEDICATION: Performed by: RADIOLOGY

## 2024-06-28 PROCEDURE — 6360000002 HC RX W HCPCS: Performed by: STUDENT IN AN ORGANIZED HEALTH CARE EDUCATION/TRAINING PROGRAM

## 2024-06-28 PROCEDURE — 71275 CT ANGIOGRAPHY CHEST: CPT

## 2024-06-28 PROCEDURE — 2580000003 HC RX 258: Performed by: STUDENT IN AN ORGANIZED HEALTH CARE EDUCATION/TRAINING PROGRAM

## 2024-06-28 PROCEDURE — 90471 IMMUNIZATION ADMIN: CPT | Performed by: STUDENT IN AN ORGANIZED HEALTH CARE EDUCATION/TRAINING PROGRAM

## 2024-06-28 PROCEDURE — 80179 DRUG ASSAY SALICYLATE: CPT

## 2024-06-28 PROCEDURE — 85730 THROMBOPLASTIN TIME PARTIAL: CPT

## 2024-06-28 PROCEDURE — 86900 BLOOD TYPING SEROLOGIC ABO: CPT

## 2024-06-28 PROCEDURE — 2580000003 HC RX 258: Performed by: SPECIALIST/TECHNOLOGIST

## 2024-06-28 PROCEDURE — 82805 BLOOD GASES W/O2 SATURATION: CPT

## 2024-06-28 PROCEDURE — 86850 RBC ANTIBODY SCREEN: CPT

## 2024-06-28 PROCEDURE — 73020 X-RAY EXAM OF SHOULDER: CPT

## 2024-06-28 PROCEDURE — 71045 X-RAY EXAM CHEST 1 VIEW: CPT

## 2024-06-28 PROCEDURE — 71260 CT THORAX DX C+: CPT

## 2024-06-28 PROCEDURE — 72125 CT NECK SPINE W/O DYE: CPT

## 2024-06-28 PROCEDURE — 70450 CT HEAD/BRAIN W/O DYE: CPT

## 2024-06-28 PROCEDURE — 99285 EMERGENCY DEPT VISIT HI MDM: CPT

## 2024-06-28 PROCEDURE — 96374 THER/PROPH/DIAG INJ IV PUSH: CPT

## 2024-06-28 PROCEDURE — 99223 1ST HOSP IP/OBS HIGH 75: CPT | Performed by: SURGERY

## 2024-06-28 PROCEDURE — 1200000000 HC SEMI PRIVATE

## 2024-06-28 PROCEDURE — 6370000000 HC RX 637 (ALT 250 FOR IP): Performed by: STUDENT IN AN ORGANIZED HEALTH CARE EDUCATION/TRAINING PROGRAM

## 2024-06-28 PROCEDURE — 99222 1ST HOSP IP/OBS MODERATE 55: CPT | Performed by: ORTHOPAEDIC SURGERY

## 2024-06-28 PROCEDURE — 86901 BLOOD TYPING SEROLOGIC RH(D): CPT

## 2024-06-28 PROCEDURE — 6360000002 HC RX W HCPCS: Performed by: SPECIALIST/TECHNOLOGIST

## 2024-06-28 PROCEDURE — 6360000002 HC RX W HCPCS: Performed by: EMERGENCY MEDICINE

## 2024-06-28 PROCEDURE — 90714 TD VACC NO PRESV 7 YRS+ IM: CPT | Performed by: STUDENT IN AN ORGANIZED HEALTH CARE EDUCATION/TRAINING PROGRAM

## 2024-06-28 PROCEDURE — 73030 X-RAY EXAM OF SHOULDER: CPT

## 2024-06-28 PROCEDURE — 70498 CT ANGIOGRAPHY NECK: CPT

## 2024-06-28 PROCEDURE — 36410 VNPNXR 3YR/> PHY/QHP DX/THER: CPT | Performed by: SURGERY

## 2024-06-28 PROCEDURE — 83605 ASSAY OF LACTIC ACID: CPT

## 2024-06-28 PROCEDURE — 36600 WITHDRAWAL OF ARTERIAL BLOOD: CPT | Performed by: SURGERY

## 2024-06-28 PROCEDURE — 85025 COMPLETE CBC W/AUTO DIFF WBC: CPT

## 2024-06-28 PROCEDURE — 80143 DRUG ASSAY ACETAMINOPHEN: CPT

## 2024-06-28 PROCEDURE — 6810039001 HC L1 TRAUMA PRIORITY

## 2024-06-28 PROCEDURE — 84132 ASSAY OF SERUM POTASSIUM: CPT

## 2024-06-28 PROCEDURE — 80307 DRUG TEST PRSMV CHEM ANLYZR: CPT

## 2024-06-28 PROCEDURE — 23500 CLTX CLAVICULAR FX W/O MNPJ: CPT | Performed by: ORTHOPAEDIC SURGERY

## 2024-06-28 PROCEDURE — 73130 X-RAY EXAM OF HAND: CPT

## 2024-06-28 RX ORDER — SODIUM CHLORIDE, SODIUM LACTATE, POTASSIUM CHLORIDE, CALCIUM CHLORIDE 600; 310; 30; 20 MG/100ML; MG/100ML; MG/100ML; MG/100ML
INJECTION, SOLUTION INTRAVENOUS CONTINUOUS
Status: DISCONTINUED | OUTPATIENT
Start: 2024-06-28 | End: 2024-06-28

## 2024-06-28 RX ORDER — OXYCODONE HYDROCHLORIDE 5 MG/1
5 TABLET ORAL EVERY 4 HOURS PRN
Status: DISCONTINUED | OUTPATIENT
Start: 2024-06-28 | End: 2024-06-29 | Stop reason: HOSPADM

## 2024-06-28 RX ORDER — OXYCODONE HYDROCHLORIDE 10 MG/1
10 TABLET ORAL EVERY 4 HOURS PRN
Status: DISCONTINUED | OUTPATIENT
Start: 2024-06-28 | End: 2024-06-29 | Stop reason: HOSPADM

## 2024-06-28 RX ORDER — SODIUM CHLORIDE 0.9 % (FLUSH) 0.9 %
10 SYRINGE (ML) INJECTION EVERY 12 HOURS SCHEDULED
Status: DISCONTINUED | OUTPATIENT
Start: 2024-06-28 | End: 2024-06-29 | Stop reason: HOSPADM

## 2024-06-28 RX ORDER — SODIUM CHLORIDE 9 MG/ML
INJECTION, SOLUTION INTRAVENOUS PRN
Status: DISCONTINUED | OUTPATIENT
Start: 2024-06-28 | End: 2024-06-29 | Stop reason: HOSPADM

## 2024-06-28 RX ORDER — FENTANYL CITRATE 50 UG/ML
INJECTION, SOLUTION INTRAMUSCULAR; INTRAVENOUS DAILY PRN
Status: COMPLETED | OUTPATIENT
Start: 2024-06-28 | End: 2024-06-28

## 2024-06-28 RX ORDER — SODIUM CHLORIDE 0.9 % (FLUSH) 0.9 %
10 SYRINGE (ML) INJECTION PRN
Status: DISCONTINUED | OUTPATIENT
Start: 2024-06-28 | End: 2024-06-29 | Stop reason: HOSPADM

## 2024-06-28 RX ORDER — METHOCARBAMOL 750 MG/1
1500 TABLET, FILM COATED ORAL 4 TIMES DAILY
Status: DISCONTINUED | OUTPATIENT
Start: 2024-06-28 | End: 2024-06-29 | Stop reason: HOSPADM

## 2024-06-28 RX ORDER — POLYETHYLENE GLYCOL 3350 17 G/17G
17 POWDER, FOR SOLUTION ORAL DAILY
Status: DISCONTINUED | OUTPATIENT
Start: 2024-06-28 | End: 2024-06-29 | Stop reason: HOSPADM

## 2024-06-28 RX ORDER — ACETAMINOPHEN 325 MG/1
650 TABLET ORAL EVERY 6 HOURS
Status: DISCONTINUED | OUTPATIENT
Start: 2024-06-28 | End: 2024-06-29 | Stop reason: HOSPADM

## 2024-06-28 RX ORDER — ONDANSETRON 2 MG/ML
4 INJECTION INTRAMUSCULAR; INTRAVENOUS EVERY 6 HOURS PRN
Status: DISCONTINUED | OUTPATIENT
Start: 2024-06-28 | End: 2024-06-29 | Stop reason: HOSPADM

## 2024-06-28 RX ORDER — ONDANSETRON 4 MG/1
4 TABLET, ORALLY DISINTEGRATING ORAL EVERY 8 HOURS PRN
Status: DISCONTINUED | OUTPATIENT
Start: 2024-06-28 | End: 2024-06-29 | Stop reason: HOSPADM

## 2024-06-28 RX ORDER — TETANUS AND DIPHTHERIA TOXOIDS ADSORBED 2; 2 [LF]/.5ML; [LF]/.5ML
0.5 INJECTION INTRAMUSCULAR ONCE
Status: COMPLETED | OUTPATIENT
Start: 2024-06-28 | End: 2024-06-28

## 2024-06-28 RX ADMIN — SODIUM CHLORIDE, PRESERVATIVE FREE 10 ML: 5 INJECTION INTRAVENOUS at 08:50

## 2024-06-28 RX ADMIN — SODIUM CHLORIDE, POTASSIUM CHLORIDE, SODIUM LACTATE AND CALCIUM CHLORIDE: 600; 310; 30; 20 INJECTION, SOLUTION INTRAVENOUS at 13:11

## 2024-06-28 RX ADMIN — OXYCODONE HYDROCHLORIDE 10 MG: 10 TABLET ORAL at 08:50

## 2024-06-28 RX ADMIN — METHOCARBAMOL 1500 MG: 750 TABLET ORAL at 08:50

## 2024-06-28 RX ADMIN — SODIUM CHLORIDE, PRESERVATIVE FREE 10 ML: 5 INJECTION INTRAVENOUS at 20:19

## 2024-06-28 RX ADMIN — OXYCODONE HYDROCHLORIDE 10 MG: 10 TABLET ORAL at 13:24

## 2024-06-28 RX ADMIN — IOPAMIDOL 75 ML: 755 INJECTION, SOLUTION INTRAVENOUS at 04:44

## 2024-06-28 RX ADMIN — WATER 2000 MG: 1 INJECTION INTRAMUSCULAR; INTRAVENOUS; SUBCUTANEOUS at 20:31

## 2024-06-28 RX ADMIN — ACETAMINOPHEN 650 MG: 325 TABLET ORAL at 08:50

## 2024-06-28 RX ADMIN — IOPAMIDOL 75 ML: 755 INJECTION, SOLUTION INTRAVENOUS at 03:37

## 2024-06-28 RX ADMIN — FENTANYL CITRATE 50 MCG: 50 INJECTION, SOLUTION INTRAMUSCULAR; INTRAVENOUS at 03:13

## 2024-06-28 RX ADMIN — TETANUS AND DIPHTHERIA TOXOIDS ADSORBED 0.5 ML: 2; 2 INJECTION INTRAMUSCULAR at 03:46

## 2024-06-28 RX ADMIN — WATER 2000 MG: 1 INJECTION INTRAMUSCULAR; INTRAVENOUS; SUBCUTANEOUS at 05:21

## 2024-06-28 RX ADMIN — METHOCARBAMOL 1500 MG: 750 TABLET ORAL at 13:24

## 2024-06-28 RX ADMIN — SODIUM CHLORIDE, POTASSIUM CHLORIDE, SODIUM LACTATE AND CALCIUM CHLORIDE: 600; 310; 30; 20 INJECTION, SOLUTION INTRAVENOUS at 04:41

## 2024-06-28 RX ADMIN — OXYCODONE HYDROCHLORIDE 10 MG: 10 TABLET ORAL at 20:19

## 2024-06-28 RX ADMIN — OXYCODONE HYDROCHLORIDE 10 MG: 10 TABLET ORAL at 03:41

## 2024-06-28 RX ADMIN — ACETAMINOPHEN 650 MG: 325 TABLET ORAL at 20:19

## 2024-06-28 RX ADMIN — WATER 2000 MG: 1 INJECTION INTRAMUSCULAR; INTRAVENOUS; SUBCUTANEOUS at 13:02

## 2024-06-28 RX ADMIN — ACETAMINOPHEN 650 MG: 325 TABLET ORAL at 03:41

## 2024-06-28 RX ADMIN — METHOCARBAMOL 1500 MG: 750 TABLET ORAL at 20:18

## 2024-06-28 ASSESSMENT — PAIN SCALES - GENERAL
PAINLEVEL_OUTOF10: 6
PAINLEVEL_OUTOF10: 8
PAINLEVEL_OUTOF10: 10
PAINLEVEL_OUTOF10: 9
PAINLEVEL_OUTOF10: 6

## 2024-06-28 ASSESSMENT — PAIN DESCRIPTION - DESCRIPTORS
DESCRIPTORS: ACHING;DISCOMFORT;SORE
DESCRIPTORS: DISCOMFORT

## 2024-06-28 ASSESSMENT — PAIN - FUNCTIONAL ASSESSMENT: PAIN_FUNCTIONAL_ASSESSMENT: PREVENTS OR INTERFERES SOME ACTIVE ACTIVITIES AND ADLS

## 2024-06-28 ASSESSMENT — PAIN DESCRIPTION - ORIENTATION
ORIENTATION: MID
ORIENTATION: RIGHT

## 2024-06-28 ASSESSMENT — PAIN DESCRIPTION - FREQUENCY: FREQUENCY: CONTINUOUS

## 2024-06-28 ASSESSMENT — PAIN DESCRIPTION - ONSET: ONSET: ON-GOING

## 2024-06-28 ASSESSMENT — PAIN DESCRIPTION - LOCATION
LOCATION: BACK
LOCATION: SHOULDER

## 2024-06-28 ASSESSMENT — PAIN DESCRIPTION - PAIN TYPE: TYPE: ACUTE PAIN
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
## 2024-06-29 VITALS
HEIGHT: 73 IN | SYSTOLIC BLOOD PRESSURE: 143 MMHG | DIASTOLIC BLOOD PRESSURE: 94 MMHG | HEART RATE: 70 BPM | WEIGHT: 200 LBS | WEIGHT: 200 LBS | TEMPERATURE: 97.9 F | HEART RATE: 70 BPM | DIASTOLIC BLOOD PRESSURE: 94 MMHG | SYSTOLIC BLOOD PRESSURE: 143 MMHG | TEMPERATURE: 97.9 F | HEIGHT: 73 IN | RESPIRATION RATE: 17 BRPM | BODY MASS INDEX: 26.51 KG/M2 | OXYGEN SATURATION: 98 % | OXYGEN SATURATION: 98 % | BODY MASS INDEX: 26.51 KG/M2 | RESPIRATION RATE: 17 BRPM

## 2024-06-29 LAB
ANION GAP SERPL CALCULATED.3IONS-SCNC: 8 MMOL/L (ref 7–16)
ANION GAP SERPL CALCULATED.3IONS-SCNC: 8 MMOL/L (ref 7–16)
BASOPHILS # BLD: 0.02 K/UL (ref 0–0.2)
BASOPHILS # BLD: 0.02 K/UL (ref 0–0.2)
BASOPHILS NFR BLD: 0 % (ref 0–2)
BASOPHILS NFR BLD: 0 % (ref 0–2)
BUN SERPL-MCNC: 9 MG/DL (ref 6–20)
BUN SERPL-MCNC: 9 MG/DL (ref 6–20)
CALCIUM SERPL-MCNC: 8.5 MG/DL (ref 8.6–10.2)
CALCIUM SERPL-MCNC: 8.5 MG/DL (ref 8.6–10.2)
CHLORIDE SERPL-SCNC: 103 MMOL/L (ref 98–107)
CHLORIDE SERPL-SCNC: 103 MMOL/L (ref 98–107)
CO2 SERPL-SCNC: 25 MMOL/L (ref 22–29)
CO2 SERPL-SCNC: 25 MMOL/L (ref 22–29)
CREAT SERPL-MCNC: 0.8 MG/DL (ref 0.7–1.2)
CREAT SERPL-MCNC: 0.8 MG/DL (ref 0.7–1.2)
EOSINOPHIL # BLD: 0.04 K/UL (ref 0.05–0.5)
EOSINOPHIL # BLD: 0.04 K/UL (ref 0.05–0.5)
EOSINOPHILS RELATIVE PERCENT: 1 % (ref 0–6)
EOSINOPHILS RELATIVE PERCENT: 1 % (ref 0–6)
ERYTHROCYTE [DISTWIDTH] IN BLOOD BY AUTOMATED COUNT: 14 % (ref 11.5–15)
ERYTHROCYTE [DISTWIDTH] IN BLOOD BY AUTOMATED COUNT: 14 % (ref 11.5–15)
GFR, ESTIMATED: >90 ML/MIN/1.73M2
GFR, ESTIMATED: >90 ML/MIN/1.73M2
GLUCOSE SERPL-MCNC: 98 MG/DL (ref 74–99)
GLUCOSE SERPL-MCNC: 98 MG/DL (ref 74–99)
HCT VFR BLD AUTO: 35.2 % (ref 37–54)
HCT VFR BLD AUTO: 35.2 % (ref 37–54)
HGB BLD-MCNC: 11.7 G/DL (ref 12.5–16.5)
HGB BLD-MCNC: 11.7 G/DL (ref 12.5–16.5)
IMM GRANULOCYTES # BLD AUTO: <0.03 K/UL (ref 0–0.58)
IMM GRANULOCYTES # BLD AUTO: <0.03 K/UL (ref 0–0.58)
IMM GRANULOCYTES NFR BLD: 0 % (ref 0–5)
IMM GRANULOCYTES NFR BLD: 0 % (ref 0–5)
LYMPHOCYTES NFR BLD: 2.62 K/UL (ref 1.5–4)
LYMPHOCYTES NFR BLD: 2.62 K/UL (ref 1.5–4)
LYMPHOCYTES RELATIVE PERCENT: 40 % (ref 20–42)
LYMPHOCYTES RELATIVE PERCENT: 40 % (ref 20–42)
MCH RBC QN AUTO: 29.7 PG (ref 26–35)
MCH RBC QN AUTO: 29.7 PG (ref 26–35)
MCHC RBC AUTO-ENTMCNC: 33.2 G/DL (ref 32–34.5)
MCHC RBC AUTO-ENTMCNC: 33.2 G/DL (ref 32–34.5)
MCV RBC AUTO: 89.3 FL (ref 80–99.9)
MCV RBC AUTO: 89.3 FL (ref 80–99.9)
MONOCYTES NFR BLD: 0.66 K/UL (ref 0.1–0.95)
MONOCYTES NFR BLD: 0.66 K/UL (ref 0.1–0.95)
MONOCYTES NFR BLD: 10 % (ref 2–12)
MONOCYTES NFR BLD: 10 % (ref 2–12)
NEUTROPHILS NFR BLD: 48 % (ref 43–80)
NEUTROPHILS NFR BLD: 48 % (ref 43–80)
NEUTS SEG NFR BLD: 3.13 K/UL (ref 1.8–7.3)
NEUTS SEG NFR BLD: 3.13 K/UL (ref 1.8–7.3)
PLATELET # BLD AUTO: 188 K/UL (ref 130–450)
PLATELET # BLD AUTO: 188 K/UL (ref 130–450)
PMV BLD AUTO: 10.9 FL (ref 7–12)
PMV BLD AUTO: 10.9 FL (ref 7–12)
POTASSIUM SERPL-SCNC: 3.7 MMOL/L (ref 3.5–5)
POTASSIUM SERPL-SCNC: 3.7 MMOL/L (ref 3.5–5)
RBC # BLD AUTO: 3.94 M/UL (ref 3.8–5.8)
RBC # BLD AUTO: 3.94 M/UL (ref 3.8–5.8)
SODIUM SERPL-SCNC: 136 MMOL/L (ref 132–146)
SODIUM SERPL-SCNC: 136 MMOL/L (ref 132–146)
WBC OTHER # BLD: 6.5 K/UL (ref 4.5–11.5)
WBC OTHER # BLD: 6.5 K/UL (ref 4.5–11.5)

## 2024-06-29 PROCEDURE — 99232 SBSQ HOSP IP/OBS MODERATE 35: CPT | Performed by: STUDENT IN AN ORGANIZED HEALTH CARE EDUCATION/TRAINING PROGRAM

## 2024-06-29 PROCEDURE — 85025 COMPLETE CBC W/AUTO DIFF WBC: CPT

## 2024-06-29 PROCEDURE — 6370000000 HC RX 637 (ALT 250 FOR IP): Performed by: STUDENT IN AN ORGANIZED HEALTH CARE EDUCATION/TRAINING PROGRAM

## 2024-06-29 PROCEDURE — 97165 OT EVAL LOW COMPLEX 30 MIN: CPT

## 2024-06-29 PROCEDURE — 97530 THERAPEUTIC ACTIVITIES: CPT

## 2024-06-29 PROCEDURE — 2580000003 HC RX 258: Performed by: STUDENT IN AN ORGANIZED HEALTH CARE EDUCATION/TRAINING PROGRAM

## 2024-06-29 PROCEDURE — 97161 PT EVAL LOW COMPLEX 20 MIN: CPT

## 2024-06-29 PROCEDURE — 80048 BASIC METABOLIC PNL TOTAL CA: CPT

## 2024-06-29 PROCEDURE — 6370000000 HC RX 637 (ALT 250 FOR IP)

## 2024-06-29 PROCEDURE — 97535 SELF CARE MNGMENT TRAINING: CPT

## 2024-06-29 PROCEDURE — 36415 COLL VENOUS BLD VENIPUNCTURE: CPT

## 2024-06-29 RX ORDER — METHOCARBAMOL 750 MG/1
1500 TABLET, FILM COATED ORAL 4 TIMES DAILY
Qty: 80 TABLET | Refills: 0 | Status: SHIPPED | OUTPATIENT
Start: 2024-06-29 | End: 2024-07-09

## 2024-06-29 RX ORDER — POLYETHYLENE GLYCOL 3350 17 G/17G
17 POWDER, FOR SOLUTION ORAL DAILY
Qty: 30 PACKET | Refills: 0 | Status: SHIPPED | OUTPATIENT
Start: 2024-06-29 | End: 2024-07-29

## 2024-06-29 RX ORDER — OXYCODONE HYDROCHLORIDE 10 MG/1
10 TABLET ORAL EVERY 6 HOURS PRN
Qty: 28 TABLET | Refills: 0 | Status: SHIPPED | OUTPATIENT
Start: 2024-06-29 | End: 2024-07-06

## 2024-06-29 RX ADMIN — OXYCODONE HYDROCHLORIDE 5 MG: 5 TABLET ORAL at 13:41

## 2024-06-29 RX ADMIN — SODIUM CHLORIDE, PRESERVATIVE FREE 10 ML: 5 INJECTION INTRAVENOUS at 10:39

## 2024-06-29 RX ADMIN — METHOCARBAMOL 1500 MG: 750 TABLET ORAL at 10:39

## 2024-06-29 RX ADMIN — METHOCARBAMOL 1500 MG: 750 TABLET ORAL at 13:28

## 2024-06-29 RX ADMIN — ACETAMINOPHEN 650 MG: 325 TABLET ORAL at 04:28

## 2024-06-29 RX ADMIN — OXYCODONE HYDROCHLORIDE 10 MG: 10 TABLET ORAL at 04:27

## 2024-06-29 RX ADMIN — OXYCODONE HYDROCHLORIDE 10 MG: 10 TABLET ORAL at 00:24

## 2024-06-29 RX ADMIN — POTASSIUM BICARBONATE 40 MEQ: 782 TABLET, EFFERVESCENT ORAL at 08:16

## 2024-06-29 RX ADMIN — ACETAMINOPHEN 650 MG: 325 TABLET ORAL at 10:38

## 2024-06-29 ASSESSMENT — PAIN SCALES - GENERAL
PAINLEVEL_OUTOF10: 5
PAINLEVEL_OUTOF10: 6
PAINLEVEL_OUTOF10: 5
PAINLEVEL_OUTOF10: 9
PAINLEVEL_OUTOF10: 8
PAINLEVEL_OUTOF10: 4

## 2024-06-29 ASSESSMENT — PAIN DESCRIPTION - LOCATION
LOCATION: ARM
LOCATION: SHOULDER
LOCATION: SHOULDER;BACK
LOCATION: ARM;SHOULDER

## 2024-06-29 ASSESSMENT — PAIN DESCRIPTION - ONSET
ONSET: ON-GOING
ONSET: ON-GOING

## 2024-06-29 ASSESSMENT — PAIN - FUNCTIONAL ASSESSMENT
PAIN_FUNCTIONAL_ASSESSMENT: PREVENTS OR INTERFERES SOME ACTIVE ACTIVITIES AND ADLS

## 2024-06-29 ASSESSMENT — PAIN DESCRIPTION - DESCRIPTORS
DESCRIPTORS: ACHING;DISCOMFORT;DULL
DESCRIPTORS: DISCOMFORT;SORE;TENDER
DESCRIPTORS: ACHING;DISCOMFORT;SORE;PRESSURE

## 2024-06-29 ASSESSMENT — PAIN DESCRIPTION - ORIENTATION
ORIENTATION: POSTERIOR;UPPER;RIGHT
ORIENTATION: RIGHT
ORIENTATION: RIGHT
ORIENTATION: RIGHT;UPPER

## 2024-06-29 ASSESSMENT — PAIN DESCRIPTION - PAIN TYPE
TYPE: ACUTE PAIN
TYPE: ACUTE PAIN

## 2024-06-29 ASSESSMENT — PAIN DESCRIPTION - FREQUENCY
FREQUENCY: CONTINUOUS
FREQUENCY: CONTINUOUS

## 2024-06-29 NOTE — PROGRESS NOTES
integrity  * Therapeutic exercise to improve motor endurance, ROM, and functional strength for ADLs/functional transfers  * Therapeutic activities to facilitate/challenge dynamic balance, stand tolerance for increased safety and independence with ADLs  * Therapeutic activities to facilitate gross/fine motor skills for increased independence with ADLs    Home Living: Pt lives in a 2STH with 1STE. Pts bed and bath on second floor with a full flight    Bathroom setup: ?   Equipment owned: ?    Prior Level of Function: indep with ADLs , indep with IADLs; engaged in functional mobility with use of  no AD  Driving: active   Occupation: ?    Pain Level: 5/10 RUE; OT provided education on compensatory strategies, repositioning, and diversion for pain management    Cognition: A&O: 4/4; Follows multi step directions   Memory:  G   Sequencing:  G   Problem solving:  G   Judgement/safety:  G     Functional Assessment:  AM-PAC Daily Activity Raw Score: 22/24   Initial Eval Status  Date: 06/29/24 Treatment Status  Date: STGs = LTGs  Time frame: 10-14 days   Feeding Independent       Grooming Independent       UB Dressing Reece  Pt requires assist to destinee RUE sling, edu provided for physician clarification regarding progression of ROM  Modified Haakon    LB Dressing Independent       Bathing Independent      Toileting Independent       Bed Mobility  Log Roll: Independent   Supine to sit: Independent   Sit to supine: Independent       Functional Transfers Sit to stand:Independent    Stand to sit:Independent   Stand pivot: Independent   Commode: Independent      Functional Mobility Independent   Without AD, in hallway and room  > household distance     Balance Sitting:     Static - Independent      Dynamic - Independent   Standing: Independent      Activity Tolerance Good      Visual/  Perceptual WFL     Safety Fair+  Edu in NWB RUE this date  Good  during ADL completion following NWB precautions   Vitals WFL           Hand  0825  Time Out: 0850  Total Treatment Time: 10 min    Min Units   OT Eval Low 91357  X     OT Eval Medium 17254      OT Eval High 22448      OT Re-Eval 83200       Therapeutic Ex 20295       Therapeutic Activities 65283  2 0    ADL/Self Care 97116  8 1    Orthotic Management 03261       Manual 22247     Neuro Re-Ed 97067       Non-Billable Time          Evaluation Time additionally includes thorough review of current medical information, gathering information on past medical history/social history and prior level of function, interpretation of standardized testing/informal observation of tasks, assessment of data and development of plan of care and goals.          Lee Ann Montano, RAGHAV, OTR/L; [id number]       '
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
## 2024-06-29 NOTE — PROGRESS NOTES
CLINICAL PHARMACY NOTE: MEDS TO BEDS    Total # of Prescriptions Filled: 3   The following medications were delivered to the patient:  Oxycodone 10 mg  Polyethylene glycol  Methocarbamol 750 mg    Additional Documentation:     Denise Lozoya (mom) picked up in the pharmacy
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
## 2024-06-29 NOTE — PROGRESS NOTES
4 Eyes Skin Assessment     NAME:  Ricco Zhagn  YOB: 1995  MEDICAL RECORD NUMBER:  [id number]    The patient is being assessed for  Admission    I agree that at least one RN has performed a thorough Head to Toe Skin Assessment on the patient. ALL assessment sites listed below have been assessed.      Areas assessed by both nurses:    Head, Face, Ears, Shoulders, Back, Chest, Arms, Elbows, Hands, Sacrum. Buttock, Coccyx, Ischium, Legs. Feet and Heels, and Under Medical Devices         Does the Patient have a Wound? No noted wound(s)       Dylan Prevention initiated by RN: No  Wound Care Orders initiated by RN: No    Pressure Injury (Stage 3,4, Unstageable, DTI, NWPT, and Complex wounds) if present, place Wound referral order by RN under : No    New Ostomies, if present place, Ostomy referral order under : No     Nurse 1 eSignature: Electronically signed by Carmela Chaney RN on 6/29/24 at 1:28 AM EDT    **SHARE this note so that the co-signing nurse can place an eSignature**    Nurse 2 eSignature: Electronically signed by Eneida Carlos RN on 6/29/24 at 1:29 AM EDT

## 2024-06-30 NOTE — PLAN OF CARE
Problem: Discharge Planning  Goal: Discharge to home or other facility with appropriate resources  Outcome: Adequate for Discharge     Problem: Pain  Goal: Verbalizes/displays adequate comfort level or baseline comfort level  Outcome: Adequate for Discharge     Problem: Safety - Adult  Goal: Free from fall injury  Outcome: Completed

## 2024-07-02 ENCOUNTER — TELEPHONE (OUTPATIENT)
Dept: ORTHOPEDIC SURGERY | Age: 29
End: 2024-07-02
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
